# Patient Record
Sex: FEMALE | Race: WHITE | NOT HISPANIC OR LATINO | Employment: FULL TIME | URBAN - METROPOLITAN AREA
[De-identification: names, ages, dates, MRNs, and addresses within clinical notes are randomized per-mention and may not be internally consistent; named-entity substitution may affect disease eponyms.]

---

## 2019-07-09 ENCOUNTER — TRANSCRIBE ORDERS (OUTPATIENT)
Dept: ADMINISTRATIVE | Facility: HOSPITAL | Age: 44
End: 2019-07-09

## 2019-07-09 DIAGNOSIS — Z12.31 VISIT FOR SCREENING MAMMOGRAM: Primary | ICD-10-CM

## 2019-07-24 ENCOUNTER — OFFICE VISIT (OUTPATIENT)
Dept: OBSTETRICS AND GYNECOLOGY | Facility: CLINIC | Age: 44
End: 2019-07-24

## 2019-07-24 VITALS
SYSTOLIC BLOOD PRESSURE: 122 MMHG | DIASTOLIC BLOOD PRESSURE: 70 MMHG | HEIGHT: 64 IN | WEIGHT: 199 LBS | BODY MASS INDEX: 33.97 KG/M2

## 2019-07-24 DIAGNOSIS — Z01.419 ENCOUNTER FOR WELL WOMAN EXAM WITH ROUTINE GYNECOLOGICAL EXAM: Primary | ICD-10-CM

## 2019-07-24 PROCEDURE — 99386 PREV VISIT NEW AGE 40-64: CPT | Performed by: OBSTETRICS & GYNECOLOGY

## 2019-07-24 RX ORDER — HEPATITIS A VACCINE, INACTIVATED 50 [IU]/ML
INJECTION, SUSPENSION INTRAMUSCULAR
Refills: 0 | COMMUNITY
Start: 2019-04-22 | End: 2020-07-29

## 2019-07-24 NOTE — PROGRESS NOTES
"Subjective   Chief Complaint   Patient presents with   • Annual Exam     Anju Barajas is a 44 y.o. year old  menopausal female presenting to be seen for her annual exam.  There has not been vaginal bleeding in the last 12 months. Dec 2015   Hot flashes and night sweats are not a significant problem.  Skin seems aged and thinner -TSH normal in the fall vit D was low - took Rx     SEXUAL Hx:  She is sexually active.  Vaginal dryness is not a problem.  [place] is painful:no birth control is vasectomy and postmenopausal.  She has concerns about domestic violence: no    HEALTH Hx:  She exercises regularly: yes. Walks 3+ DAYS A WEEK AND SWIMS  She wears her seat belt:yes.  Self breast awareness: no  She has noticed changes in height: no              Calcium intake is not adequate 1 daily              Caffeine intake: no or mild caffeine use    The following portions of the patient's history were reviewed and updated as appropriate:problem list, current medications, allergies, past family history, past medical history, past social history and past surgical history.    Current Outpatient Medications:   •  VAQTA 50 UNIT/ML injection, , Disp: , Rfl: 0    Social History    Tobacco Use      Smoking status: Never Smoker      Smokeless tobacco: Never Used    Social History     Substance and Sexual Activity   Alcohol Use Yes   • Frequency: 2-4 times a month    Comment: social         Review of Systems   Her bowels and bladder are normal; GYN questionnaire left blank x8.  Medical history questions negative x8.  System review negative x11.  Positive for urinary leakage.  Social history she is  no smoking or alcohol only socially.  Family history reviewed no colon, ovarian, breast cancer no osteoporosis.  3 vaginal deliveries.     Objective   /70   Ht 161.3 cm (63.5\")   Wt 90.3 kg (199 lb)   Breastfeeding? No   BMI 34.70 kg/m²      General:  well developed; well nourished  no acute " distress  appears stated age   Skin:  No suspicious lesions seen   Thyroid: not examined   Breasts:  Examined in supine position  Symmetric without masses or skin dimpling  Nipples normal without inversion, lesions or discharge  Fibrocystic changes are present both breasts without a discrete mass   Abdomen: soft, non-tender; no masses  no umbilical or inguinal hernias are present  no hepato-splenomegaly   Pelvis: Clinical staff was present for exam  External genitalia:  normal appearance of the external genitalia including Bartholin's and [place]'s glands.  :  urethral meatus normal;  Vaginal:  atrophic mucosal changes are present;  Cervix:  normal appearance. Pap obtained  Uterus:  normal size, shape and consistency. retroverted;  Adnexa:  non palpable bilaterally.   She was able to do a week Kegel.       Lab Review   FSH, Pap test and TSH  Imaging review  Mammogram report       Assessment   1. Postmenopausal last.  December 2015.  2. Occasional urinary leakage.  Discussed timed voiding Kegel exercises  3. Mammograms and colonoscopy by age 50 discussed       Plan   1. Kegel exercises 5 minutes daily and as needed.  Time voiding  2. Calcium discussed  1200 mg daily in divided doses ideally in diet.  She may need to add 500 mg calcium with vitamin D along with thousand international units of vitamin D due to low vitamin D level.  3. Regular weight bearing exercise  4. Breast self awareness, mammograms discussed this is scheduled in August.  5. Annual or sooner as needed; follow-up Pap co-testing if normal due every 3 years.    No orders of the defined types were placed in this encounter.    No orders of the defined types were placed in this encounter.             This note was electronically signed.    Lambert Dumont M.D.  July 24, 2019

## 2019-08-30 ENCOUNTER — APPOINTMENT (OUTPATIENT)
Dept: MAMMOGRAPHY | Facility: HOSPITAL | Age: 44
End: 2019-08-30

## 2019-10-24 ENCOUNTER — TRANSCRIBE ORDERS (OUTPATIENT)
Dept: ADMINISTRATIVE | Facility: HOSPITAL | Age: 44
End: 2019-10-24

## 2019-10-24 DIAGNOSIS — Z12.31 VISIT FOR SCREENING MAMMOGRAM: Primary | ICD-10-CM

## 2020-07-29 ENCOUNTER — OFFICE VISIT (OUTPATIENT)
Dept: OBSTETRICS AND GYNECOLOGY | Facility: CLINIC | Age: 45
End: 2020-07-29

## 2020-07-29 VITALS
DIASTOLIC BLOOD PRESSURE: 70 MMHG | SYSTOLIC BLOOD PRESSURE: 118 MMHG | BODY MASS INDEX: 37.21 KG/M2 | WEIGHT: 210 LBS | HEIGHT: 63 IN

## 2020-07-29 DIAGNOSIS — Z01.419 ENCOUNTER FOR GYNECOLOGICAL EXAMINATION WITHOUT ABNORMAL FINDING: Primary | ICD-10-CM

## 2020-07-29 PROBLEM — Z78.0 POSTMENOPAUSAL: Status: ACTIVE | Noted: 2020-07-29

## 2020-07-29 PROCEDURE — 99396 PREV VISIT EST AGE 40-64: CPT | Performed by: OBSTETRICS & GYNECOLOGY

## 2020-07-29 NOTE — PROGRESS NOTES
Subjective   Chief Complaint   Patient presents with   • Annual Exam     Anju Barajas is a 45 y.o. year old  menopausal female presenting to be seen for her annual exam.  There has not been vaginal bleeding in the last 12 months.  Hot flashes and night sweats are not a significant problem.    She showed me pictures new puppy in the family because unfortunate accident with a cat.  She will have a freshman in  this year    SEXUAL Hx:  She is sexually active.  Vaginal dryness is not a problem.  [place] is painful:no  She has concerns about domestic violence: no    HEALTH Hx:  She exercises regularly: no..  She wears her seat belt:yes.  Self breast awareness: no  She has noticed changes in height: no ~1/2 inch              Calcium intake is not adequate 1 daily              Caffeine intake: no or mild caffeine use    The following portions of the patient's history were reviewed and updated as appropriate:problem list, current medications, allergies, past family history, past medical history, past social history and past surgical history.    No current outpatient medications on file.    Social History    Tobacco Use      Smoking status: Never Smoker      Smokeless tobacco: Never Used    Social History     Substance and Sexual Activity   Alcohol Use Yes   • Frequency: 2-4 times a month       Review of systems  Constitutional   POS weight gain                           NEG chills, fatigue, night sweats and sweats  Breast               POS had normal mammogram in the past year - results are not in record for review and reduction surgery                           NEG persistent breast lump, skin dimpling or nipple discharge  GI                     POS nothing reported                           NEG bloating, change in bowel habits, melena or reflux symptoms                      POS MARLEN is present but it IS NOT effecting her ADL's                           NEG dysuria, frequency or hematuria      "    Objective   /70   Ht 160.7 cm (63.25\")   Wt 95.3 kg (210 lb)   Breastfeeding No   BMI 36.91 kg/m²      General:  well developed; well nourished  no acute distress  appears stated age   Skin:  No suspicious lesions seen   Thyroid: not examined   Breasts:  Examined in supine position  Symmetric without masses or skin dimpling  Nipples normal without inversion, lesions or discharge  Fibrocystic changes are present both breasts without a discrete mass  Bilateral breast reduction scars are noted   Abdomen: soft, non-tender; no masses  no umbilical or inguinal hernias are present  no hepato-splenomegaly   Pelvis: Clinical staff was present for exam  External genitalia:  normal appearance of the external genitalia including Bartholin's and [place]'s glands.  :  urethral meatus normal;  Vaginal:  atrophic mucosal changes are present;  Uterus:  normal size, shape and consistency.  Adnexa:  non palpable bilaterally.  Rectal:  digital rectal exam not performed; anus visually normal appearing.       Lab Review   FSH, Pap test, PATHOLOGY   and TSH  Imaging review  Mammogram report       Assessment     1. Normal postmenopausal examination with early menopause at age 40.  Pap cotesting up-to-date 2019  2. Mammogram normal in August prior to breast reduction surgery in [place]  3. Colonoscopies discussed       Plan     1. Annual or sooner as needed  2. Calcium discussed  1200 mg daily in divided doses ideally in diet  3. Regular weight bearing exercise  4. Breast self awareness and mammograms discussed  5. Colonoscopy discussed  6.     No orders of the defined types were placed in this encounter.     No orders of the defined types were placed in this encounter.             This note was electronically signed.    Lambert Dumont M.D.  July 29, 2020  "

## 2021-07-30 ENCOUNTER — OFFICE VISIT (OUTPATIENT)
Dept: OBSTETRICS AND GYNECOLOGY | Facility: CLINIC | Age: 46
End: 2021-07-30

## 2021-07-30 VITALS
RESPIRATION RATE: 16 BRPM | WEIGHT: 209 LBS | SYSTOLIC BLOOD PRESSURE: 118 MMHG | BODY MASS INDEX: 36.73 KG/M2 | DIASTOLIC BLOOD PRESSURE: 70 MMHG

## 2021-07-30 DIAGNOSIS — Z01.419 ENCOUNTER FOR GYNECOLOGICAL EXAMINATION WITHOUT ABNORMAL FINDING: Primary | ICD-10-CM

## 2021-07-30 DIAGNOSIS — Z12.31 ENCOUNTER FOR SCREENING MAMMOGRAM FOR MALIGNANT NEOPLASM OF BREAST: ICD-10-CM

## 2021-07-30 PROCEDURE — 99396 PREV VISIT EST AGE 40-64: CPT | Performed by: NURSE PRACTITIONER

## 2021-07-30 NOTE — PROGRESS NOTES
Annual Visit     Patient Name: Anju Barajas  : 1975   MRN: [id number]   Care Team: Patient Care Team:  Vilma Briceño DO as PCP - General  Vilma Briceño DO as PCP - Family Medicine    Chief Complaint:    Chief Complaint   Patient presents with   • Annual Exam       HPI: Anju Barajas is a 46 y.o. year old  presenting to be seen for her gynecologic exam.   Pap smear 2019 WNL and HPV negative   Menopause at age 40   Occasional hot flashes now but not overly bothersome   Mammogram 2019 in [place] per pt - she did not have a mammogram last yr   C/o hair loss since menopause - asking if there is anything else she can do to help - uses Rogaine qd  Has labs with PCP       Subjective      /70   Resp 16   Wt 94.8 kg (209 lb)   Breastfeeding No   BMI 36.73 kg/m²     BMI reviewed: Body mass index is 36.73 kg/m².      Objective     Physical Exam    Neuro: alert and oriented to person, place and time   General:  alert; cooperative; well developed; well nourished   Skin:  No suspicious lesions seen   Thyroid: normal to inspection and palpation   Lungs:  breathing is unlabored  clear to auscultation bilaterally   Heart:  regular rate and rhythm, S1, S2 normal, no murmur, click, rub or gallop  normal apical impulse   Breasts:  Examined in supine position  Symmetric without masses or skin dimpling  Nipples normal without inversion, lesions or discharge  There are no palpable axillary nodes   Abdomen: soft, non-tender; no masses  no umbilical or inguinal hernias are present  no hepato-splenomegaly   Pelvis: Clinical staff was present for exam  External genitalia:  normal appearance of the external genitalia including Bartholin's and [place]'s glands.  :  urethral meatus normal;  Vaginal:  normal pink mucosa without prolapse or lesions.  Cervix:  normal appearance.  Uterus:  normal size, shape and consistency.  Adnexa:  normal bimanual exam of the adnexa.  Rectal:  digital rectal  exam not performed; anus visually normal appearing.         Assessment / Plan      Assessment  Problems Addressed This Visit    ICD-10-CM ICD-9-CM   1. Encounter for gynecological examination without abnormal finding  Z01.419 V72.31   2. Encounter for screening mammogram for malignant neoplasm of breast  Z12.31 V76.12       Plan    1.) Pap smear not indicated today   Discussed calcium, 600 mg/ Vit. D, 400 IU daily; regular weight-bearing exercise  Recommend daily hair vitamin like Viviscal or Nutrafol and cont labs with PCP   2.) Stressed importance of annual mammogram - order given today, she plans to have it done in [place] again   Discussed monthly SBEs         40 to 64: Counseling/Anticipatory Guidance Discussed: screenings and self-breast exam    Follow Up  Return in about 1 year (around 7/30/2022) for Annual physical.  Patient was given instructions and counseling regarding her condition or for health maintenance advice. Please see specific information pulled into the AVS if appropriate.     Coty Day, PATTI  July 30, 2021  14:24 EDT

## 2023-07-26 ENCOUNTER — TRANSCRIBE ORDERS (OUTPATIENT)
Dept: ADMINISTRATIVE | Facility: HOSPITAL | Age: 48
End: 2023-07-26
Payer: COMMERCIAL

## 2023-07-26 ENCOUNTER — TELEPHONE (OUTPATIENT)
Dept: OBSTETRICS AND GYNECOLOGY | Facility: CLINIC | Age: 48
End: 2023-07-26
Payer: COMMERCIAL

## 2023-07-26 DIAGNOSIS — Z12.31 SCREENING MAMMOGRAM FOR BREAST CANCER: Primary | ICD-10-CM

## 2023-07-26 DIAGNOSIS — Z92.89 H/O DIAGNOSTIC MAMMOGRAPHY: Primary | ICD-10-CM

## 2023-07-26 DIAGNOSIS — R92.8 ABNORMAL MAMMOGRAM: Primary | ICD-10-CM

## 2023-07-26 NOTE — TELEPHONE ENCOUNTER
"    Caller: Anju Barajas \"Salinas\"    Relationship: Self    Best call back number: [phone]    What orders are you requesting (i.e. lab or imaging): MAMMOGRAM     In what timeframe would the patient need to come in: ASAP    Where will you receive your lab/imaging services: [facility]              "

## 2023-07-26 NOTE — TELEPHONE ENCOUNTER
Patient's last mammogram was a level 3, scheduled for diagnostic mammogram.  Mammogram would schedule screening, but told patient she had to have a diagnostic mammogram.

## 2023-11-20 ENCOUNTER — OFFICE VISIT (OUTPATIENT)
Dept: OBSTETRICS AND GYNECOLOGY | Facility: CLINIC | Age: 48
End: 2023-11-20
Payer: COMMERCIAL

## 2023-11-20 VITALS
SYSTOLIC BLOOD PRESSURE: 130 MMHG | DIASTOLIC BLOOD PRESSURE: 76 MMHG | HEIGHT: 63 IN | WEIGHT: 183.4 LBS | BODY MASS INDEX: 32.5 KG/M2

## 2023-11-20 DIAGNOSIS — Z01.419 ENCOUNTER FOR GYNECOLOGICAL EXAMINATION WITHOUT ABNORMAL FINDING: Primary | ICD-10-CM

## 2023-11-20 DIAGNOSIS — Z12.11 SCREENING FOR COLON CANCER: ICD-10-CM

## 2023-11-20 RX ORDER — RIMEGEPANT SULFATE 75 MG/75MG
TABLET, ORALLY DISINTEGRATING ORAL
COMMUNITY
Start: 2023-10-02

## 2023-11-21 NOTE — PROGRESS NOTES
"Chief Complaint  Anju Barajas is a 48 y.o.  female presenting for Annual Exam    History of Present Illness  Anju is a 48-year-old woman,  3 para 3-0-0-3, here for annual GYN exam.  She has no past gynecologic surgeries.    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  She has had no postmenopausal bleeding.  ROS negative.  Preventive health procedures are up-to-date.    The following portions of the patient's history were reviewed and updated as appropriate: allergies, current medications, past family history, past medical history, past social history, past surgical history, and problem list.    No Known Allergies      Current Outpatient Medications:     Nurtec 75 MG dispersible tablet, DISSOLVE 1 TABLET BY MOUTH EVERY OTHER DAY AS NEEDED, Disp: , Rfl:     Past Medical History:   Diagnosis Date    Menopause     Migraine         Past Surgical History:   Procedure Laterality Date    BACK SURGERY  10/2018    L5 S1 partial discectomy [facility]    BILATERAL BREAST REDUCTION  2019    [facility]    CHOLECYSTECTOMY  2015       Objective  /76   Ht 160.7 cm (63.25\")   Wt 83.2 kg (183 lb 6.4 oz)   Breastfeeding No   BMI 32.23 kg/m²     Physical Exam  Vitals and nursing note reviewed. Exam conducted with a chaperone present.   Constitutional:       General: She is not in acute distress.     Appearance: Normal appearance. She is not ill-appearing.   HENT:      Head: Normocephalic.   Neck:      Thyroid: No thyroid mass or thyromegaly.   Cardiovascular:      Rate and Rhythm: Normal rate and regular rhythm.      Heart sounds: Normal heart sounds. No murmur heard.  Pulmonary:      Effort: Pulmonary effort is normal. No respiratory distress.      Breath sounds: Normal breath sounds.   Chest:   Breasts:     Right: No inverted nipple, mass or nipple discharge.      Left: No inverted nipple, mass or nipple discharge.   Abdominal:      Palpations: Abdomen is soft. There is no mass.    "   Tenderness: There is no abdominal tenderness.   Genitourinary:     General: Normal vulva.      Labia:         Right: No rash, tenderness or lesion.         Left: No rash, tenderness or lesion.       Vagina: Normal. No erythema.      Cervix: No discharge, lesion or erythema.      Uterus: Not enlarged and not tender.       Adnexa:         Right: No mass or tenderness.          Left: No mass or tenderness.        Comments: Anus appears wnl.  No rectal exam performed.  Lymphadenopathy:      Upper Body:      Right upper body: No supraclavicular or axillary adenopathy.      Left upper body: No supraclavicular or axillary adenopathy.   Skin:     General: Skin is warm and dry.   Neurological:      Mental Status: She is alert and oriented to person, place, and time.   Psychiatric:         Mood and Affect: Mood normal.         Behavior: Behavior normal.         Assessment/Plan   Diagnoses and all orders for this visit:    1. Encounter for gynecological examination without abnormal finding (Primary)  -     LIQUID-BASED PAP SMEAR WITH HPV GENOTYPING REGARDLESS OF INTERPRETATION ([facility]AD)    2. Screening for colon cancer  -     Cologuard - Stool, Per Rectum; Future        Procedures    40 to 64: Counseling/Anticipatory Guidance Discussed: nutrition, physical activity, screenings, and self-breast exam    Return in about 1 year (around 11/20/2024) for Annual physical.    Anca Issa, APRN  11/20/2023

## 2023-11-22 LAB — REF LAB TEST METHOD: NORMAL

## 2024-07-23 ENCOUNTER — OFFICE VISIT (OUTPATIENT)
Dept: OBSTETRICS AND GYNECOLOGY | Facility: CLINIC | Age: 49
End: 2024-07-23
Payer: COMMERCIAL

## 2024-07-23 VITALS
BODY MASS INDEX: 32.32 KG/M2 | SYSTOLIC BLOOD PRESSURE: 140 MMHG | HEIGHT: 63 IN | DIASTOLIC BLOOD PRESSURE: 92 MMHG | WEIGHT: 182.4 LBS

## 2024-07-23 DIAGNOSIS — N90.89 VULVAR LESION: Primary | ICD-10-CM

## 2024-07-23 RX ORDER — VALACYCLOVIR HYDROCHLORIDE 1 G/1
1000 TABLET, FILM COATED ORAL 2 TIMES DAILY
Qty: 14 TABLET | Refills: 0 | Status: SHIPPED | OUTPATIENT
Start: 2024-07-23 | End: 2024-07-30

## 2024-07-23 NOTE — PROGRESS NOTES
"Chief Complaint  Anju Barajas is a 49 y.o.  female presenting for Follow-up (C/O vulvar lesions.  Present since 23.)    History of Present Illness  Salinas is a 48yo woman, , here for a problem visit.  She c/o vulvar lesions.  (See CC, above)  She has no past history of any gynecologic surgeries.  She feels that she is in a stable & monog relationship.  She had premature ovarian failure at age 39.  She denies any postmenopausal bleeding.  She has had recent travel / vacations / being hot & sweaty with activity, different soaps & toilet papers, etc.      The following portions of the patient's history were reviewed and updated as appropriate: allergies, current medications, past family history, past medical history, past social history, past surgical history, and problem list.    No Known Allergies      Current Outpatient Medications:     Nurtec 75 MG dispersible tablet, DISSOLVE 1 TABLET BY MOUTH EVERY OTHER DAY AS NEEDED, Disp: , Rfl:     valACYclovir (Valtrex) 1000 MG tablet, Take 1 tablet by mouth 2 (Two) Times a Day for 7 days., Disp: 14 tablet, Rfl: 0    valACYclovir (Valtrex) 1000 MG tablet, Take 1 tablet by mouth Daily., Disp: 90 tablet, Rfl: 3    Past Medical History:   Diagnosis Date    Menopause     Migraine         Past Surgical History:   Procedure Laterality Date    BACK SURGERY  10/2018    L5 S1 partial discectomy [facility]    BILATERAL BREAST REDUCTION  2019    [facility]    CHOLECYSTECTOMY         Objective  /92   Ht 160.7 cm (63.25\")   Wt 82.7 kg (182 lb 6.4 oz)   Breastfeeding No   BMI 32.06 kg/m²     Physical Exam  Vitals and nursing note reviewed. Exam conducted with a chaperone present.   Constitutional:       General: She is not in acute distress.     Appearance: Normal appearance. She is not ill-appearing.   Abdominal:      General: Bowel sounds are normal.      Palpations: Abdomen is soft. There is no mass.      Tenderness: There is no abdominal " tenderness.   Genitourinary:     General: Normal vulva.      Labia:         Right: Tenderness and lesion present. No rash.         Left: No rash, tenderness or lesion.       Vagina: Normal. No vaginal discharge or erythema.      Cervix: No cervical motion tenderness, discharge, lesion or erythema.      Uterus: Normal. Not enlarged and not tender.       Adnexa: Right adnexa normal and left adnexa normal.        Right: No mass or tenderness.          Left: No mass or tenderness.        Rectum: Normal.      Comments: 5 small ulcerations on the right side (some with Molluscum appearance); will get ulcerative dz panel swab.  Anus appears wnl.  (No rectal exam performed.)  Skin:     General: Skin is warm and dry.   Neurological:      Mental Status: She is oriented to person, place, and time.   Psychiatric:         Mood and Affect: Mood normal.         Behavior: Behavior normal.         Assessment/Plan   Diagnoses and all orders for this visit:    1. Vulvar lesion (Primary)  -     OneSwab - Swab, Lesion; Future    Other orders  -     valACYclovir (Valtrex) 1000 MG tablet; Take 1 tablet by mouth 2 (Two) Times a Day for 7 days.  Dispense: 14 tablet; Refill: 0    Procedures    40 to 64: Counseling/Anticipatory Guidance Discussed: self care / mike-care    Return for Next scheduled follow up.    Anca Issa, APRN  07/23/2024

## 2024-07-25 RX ORDER — VALACYCLOVIR HYDROCHLORIDE 1 G/1
1000 TABLET, FILM COATED ORAL DAILY
Qty: 90 TABLET | Refills: 3 | Status: SHIPPED | OUTPATIENT
Start: 2024-07-25

## 2025-07-23 ENCOUNTER — OFFICE VISIT (OUTPATIENT)
Dept: OBSTETRICS AND GYNECOLOGY | Facility: CLINIC | Age: 50
End: 2025-07-23
Payer: COMMERCIAL

## 2025-07-23 VITALS
RESPIRATION RATE: 16 BRPM | SYSTOLIC BLOOD PRESSURE: 116 MMHG | WEIGHT: 192 LBS | BODY MASS INDEX: 33.74 KG/M2 | DIASTOLIC BLOOD PRESSURE: 70 MMHG

## 2025-07-23 DIAGNOSIS — L65.9 HAIR THINNING: ICD-10-CM

## 2025-07-23 DIAGNOSIS — N95.8 GENITOURINARY SYNDROME OF MENOPAUSE: ICD-10-CM

## 2025-07-23 DIAGNOSIS — Z86.19 H/O HERPES GENITALIS: ICD-10-CM

## 2025-07-23 DIAGNOSIS — Z01.419 WELL WOMAN EXAM WITH ROUTINE GYNECOLOGICAL EXAM: Primary | ICD-10-CM

## 2025-07-23 RX ORDER — ESTRADIOL 0.1 MG/G
CREAM VAGINAL
Qty: 42.5 G | Refills: 3 | Status: SHIPPED | OUTPATIENT
Start: 2025-07-23

## 2025-07-23 RX ORDER — VALACYCLOVIR HYDROCHLORIDE 500 MG/1
500 TABLET, FILM COATED ORAL DAILY
Qty: 90 TABLET | Refills: 3 | Status: SHIPPED | OUTPATIENT
Start: 2025-07-23 | End: 2025-08-22

## 2025-07-23 NOTE — PROGRESS NOTES
Subjective   Chief Complaint   Patient presents with   • Annual Exam     Needs refill     Anju Barajas is a 50 y.o. year old  menopausal female presenting to be seen for her annual exam.  This past year she has not been on hormone replacement therapy.  There has not been vaginal bleeding in the last 12 months.  Menopausal symptoms are present (decreased libido and hair thinning and vaginal dryness) but not affecting her quality of life .    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 She is on 1000mg daily and is inquiring about dropping dose down to 500mg daily. She has not had anymore outbreaks since her initial one.     She is postmenopausal and went through POI @ age 39.     She has been losing hair recently. Doing PRP (plasma injection) treatments and has noticed improvement with this. She is on a maintenance plan for them with her plastic surgeon. She is also using Rogaine foam daily.     SEXUAL Hx:  She is currently sexually active.  In the past year there there has been NO new sexual partners.    Condoms are never used.  She would not like to be screened for STD's at today's exam.  Blevins is painful: yes - but has not tried OTC lubricants   HEALTH Hx:  She exercises regularly: no (but is planning to start exercising more).  She wears her seat belt: yes.  She has concerns about domestic violence: no.  She has noticed changes in height: unsure .  OTHER THINGS SHE WANTS TO DISCUSS TODAY:  Nothing else    The following portions of the patient's history were reviewed and updated as appropriate:problem list, current medications, allergies, past family history, past medical history, past social history, and past surgical history.    Screening:   Pap: 2023, NILM/HPV (-). No h/o abnormal  [id number], normal per patient report.   Colonoscopy: Cologuard 2023, negative     Family history: No family h/o breast, ovarian, colon,  pancreatic cancer     Social History    Tobacco Use      Smoking status: Never      Smokeless tobacco: Never      Review of Systems   Constitutional: Negative.  Negative for unexpected weight change.   Respiratory: Negative.  Negative for chest tightness and shortness of breath.    Cardiovascular: Negative.  Negative for chest pain.   Gastrointestinal: Negative.  Negative for constipation and diarrhea.   Endocrine: Negative.  Negative for heat intolerance.        Hair thinning    Genitourinary:  Positive for dyspareunia and vaginal pain. Negative for difficulty urinating, dysuria, frequency, genital sores, pelvic pain, urgency, vaginal bleeding and vaginal discharge.        No breast concerns   No HSV outbreaks   Skin: Negative.    Psychiatric/Behavioral: Negative.            Objective   /70   Resp 16   Wt 87.1 kg (192 lb)   BMI 33.74 kg/m²     Physical Exam  Vitals and nursing note reviewed. Exam conducted with a chaperone present.   Constitutional:       Appearance: Normal appearance. She is not ill-appearing.   HENT:      Head: Normocephalic and atraumatic.   Eyes:      General: No scleral icterus.  Neck:      Comments: Thyroid WNL  Pulmonary:      Effort: Pulmonary effort is normal. No respiratory distress.   Chest:   Breasts:     Breasts are symmetrical.      Right: Normal.      Left: Normal.   Abdominal:      General: There is no distension.      Palpations: Abdomen is soft. There is no mass.      Tenderness: There is no abdominal tenderness. There is no guarding.      Hernia: No hernia is present.   Genitourinary:     General: Normal vulva.      Exam position: Lithotomy position.      Labia:         Right: No rash, tenderness or lesion.         Left: No rash, tenderness or lesion.       Urethra: No urethral lesion.      Vagina: Normal. No vaginal discharge.      Cervix: Normal.      Uterus: Normal.       Adnexa: Right adnexa normal and left adnexa normal.      Rectum: Normal. No external hemorrhoid.       Comments: Digital rectal exam deferred, appears visually normal.    Musculoskeletal:         General: No swelling. Normal range of motion.      Cervical back: Normal range of motion.      Right lower leg: No edema.      Left lower leg: No edema.   Lymphadenopathy:      Upper Body:      Right upper body: No supraclavicular, axillary or pectoral adenopathy.      Left upper body: No supraclavicular, axillary or pectoral adenopathy.   Skin:     General: Skin is warm and dry.      Comments: Hair thinning noted on scalp   Neurological:      General: No focal deficit present.      Mental Status: She is alert and oriented to person, place, and time.   Psychiatric:         Mood and Affect: Mood normal.         Behavior: Behavior normal.         Thought Content: Thought content normal.         Judgment: Judgment normal.               Assessment     Assessment & Plan      Well woman exam  Genitourinary syndrome of menopause  H/o genital herpes (HSV1)  Hair thinning  Menopausal female currently not on HRT - without significant symptoms affecting activities of daily living  She is up to date on all relevant gynecologic and colorectal screenings     Plan   Pap was not done today.  I explained to Anju that the recommendations for Pap smear interval in a low risk patient has lengthened to 5 years time.  I told Anju she still needs to be seen in our office yearly for a full physical including breast and pelvic exam.  Vaginal estrogen prescription sent for vaginal atrophy/dyspareunia.   Valtrex dose decreased to 500mg daily and refilled. Patient instructed to call if she experiences any outbreaks, as we can increase the dose again.   Offered lab work to investigate cause of hair loss further - patient plans to speak with her PCP regarding this.   I discussed with Anju that she may be behind on needed vaccinations for TDAP, Shingles [Shingrix], and COVID booster / COVID bivalent booster.  She may be able to obtain  these vaccinations at her local pharmacy OR speak about obtaining them with her primary care.  If she does obtain her vaccines, I have asked Anju to let us know the date each vaccine was obtained so that her medical record could be updated in our system.  The importance of keeping all planned follow-up and taking all medications as prescribed was emphasized.  Follow up for annual exam 1 year    New Medications Ordered This Visit   Medications   • valACYclovir (Valtrex) 500 MG tablet     Sig: Take 1 tablet by mouth Daily for 30 days.     Dispense:  90 tablet     Refill:  3   • estradiol (ESTRACE VAGINAL) 0.1 MG/GM vaginal cream     Sig: Insert 0.5 g vaginally daily for 2 weeks then use twice weekly.     Dispense:  42.5 g     Refill:  3          Return in about 1 year (around 7/23/2026) for Annual physical.        Diana Segundo, PATTI  July 23, 2025